# Patient Record
Sex: FEMALE | Race: WHITE | HISPANIC OR LATINO | URBAN - METROPOLITAN AREA
[De-identification: names, ages, dates, MRNs, and addresses within clinical notes are randomized per-mention and may not be internally consistent; named-entity substitution may affect disease eponyms.]

---

## 2022-11-01 ENCOUNTER — WEB ENCOUNTER (OUTPATIENT)
Dept: URBAN - METROPOLITAN AREA CLINIC 60 | Facility: CLINIC | Age: 62
End: 2022-11-01

## 2022-11-04 ENCOUNTER — OFFICE VISIT (OUTPATIENT)
Dept: URBAN - METROPOLITAN AREA CLINIC 60 | Facility: CLINIC | Age: 62
End: 2022-11-04
Payer: COMMERCIAL

## 2022-11-04 VITALS
HEIGHT: 67 IN | TEMPERATURE: 98 F | SYSTOLIC BLOOD PRESSURE: 126 MMHG | WEIGHT: 184 LBS | OXYGEN SATURATION: 97 % | HEART RATE: 72 BPM | RESPIRATION RATE: 20 BRPM | BODY MASS INDEX: 28.88 KG/M2 | DIASTOLIC BLOOD PRESSURE: 74 MMHG

## 2022-11-04 DIAGNOSIS — K29.60 REFLUX GASTRITIS: ICD-10-CM

## 2022-11-04 DIAGNOSIS — K92.1 MELENA: ICD-10-CM

## 2022-11-04 DIAGNOSIS — R74.8 LIVER ENZYME ELEVATION: ICD-10-CM

## 2022-11-04 DIAGNOSIS — Z12.11 COLON CANCER SCREENING: ICD-10-CM

## 2022-11-04 DIAGNOSIS — D64.9 ANEMIA, UNSPECIFIED TYPE: ICD-10-CM

## 2022-11-04 PROCEDURE — 99244 OFF/OP CNSLTJ NEW/EST MOD 40: CPT | Performed by: NURSE PRACTITIONER

## 2022-11-04 PROCEDURE — 99204 OFFICE O/P NEW MOD 45 MIN: CPT | Performed by: NURSE PRACTITIONER

## 2022-11-04 RX ORDER — OMEPRAZOLE 40 MG/1
1 CAPSULE 30 MINUTES BEFORE MORNING MEAL CAPSULE, DELAYED RELEASE ORAL ONCE A DAY
Qty: 90 | Refills: 0 | OUTPATIENT
Start: 2022-11-04

## 2022-11-04 RX ORDER — PANTOPRAZOLE SODIUM 40 MG/1
TAKE 1 TABLET BY MOUTH EVERY MORNING BEFORE BREAKFAST TABLET, DELAYED RELEASE ORAL
Qty: 30 EACH | Refills: 0 | Status: ACTIVE | COMMUNITY

## 2022-11-04 RX ORDER — SUCRALFATE 1 G/1
1 TABLET ON AN EMPTY STOMACH TABLET ORAL TWICE A DAY
Qty: 90 | Refills: 0 | OUTPATIENT
Start: 2022-11-04 | End: 2023-02-01

## 2022-11-04 RX ORDER — MECOBALAMIN 5000 MCG
AS DIRECTED LOZENGE ORAL
Status: ACTIVE | COMMUNITY

## 2022-11-04 RX ORDER — DAPAGLIFLOZIN 5 MG/1
TABLET, FILM COATED ORAL
Qty: 90 TABLET | Refills: 0 | Status: ACTIVE | COMMUNITY

## 2022-11-04 RX ORDER — ORAL SEMAGLUTIDE 14 MG/1
TABLET ORAL
Qty: 30 TABLET | Refills: 0 | Status: ACTIVE | COMMUNITY

## 2022-11-04 RX ORDER — METFORMIN HYDROCHLORIDE 500 MG/1
TAKE 1 TABLET BY MOUTH TWICE DAILY TABLET, FILM COATED ORAL
Qty: 180 EACH | Refills: 0 | Status: ACTIVE | COMMUNITY

## 2022-11-04 NOTE — PHYSICAL EXAM GASTROINTESTINAL
Abdomen: Soft, positive for epigastric tenderness, nondistended , no guarding or rigidity , no masses palpable , normal bowel sounds , Liver and Spleen , no hepatomegaly present , no hepatosplenomegaly , liver nontender , spleen not palpable

## 2022-11-04 NOTE — HPI-TODAY'S VISIT:
Who presented to the ER department around 6 weeks ago complaining of epigastric discomfort and melanotic diarrhea, patient was diagnosed with gastroenteritis and sent home. Patient states she had melanotic stools for several days, she was referred to us for EGD and colonoscopy for anemia, melena, and elevated liver enzymes.  Today she is in good general state.  Denies any rectal bleeding, melena, hematemesis, diarrhea, or any other GI symptoms.  Denies any personal or family history of colorectal cancer. Positive for epigastric tenderness. Laboratories:  CMP normal kidney function, slightly elevated ALT 35.  Hemoglobin A1c elevated 7.6.  Blood sugar at 181.  CBC anemia hemoglobin 11.1 hematocrit 33.9.  Lipids: Triglyceride 355, LDL cholesterol low 38, slightly elevated .  EGD, colonoscopy, and liver work up.

## 2022-11-11 ENCOUNTER — LAB OUTSIDE AN ENCOUNTER (OUTPATIENT)
Dept: URBAN - METROPOLITAN AREA CLINIC 60 | Facility: CLINIC | Age: 62
End: 2022-11-11

## 2022-12-09 PROBLEM — 72950008: Status: ACTIVE | Noted: 2022-12-09

## 2022-12-14 ENCOUNTER — TELEPHONE ENCOUNTER (OUTPATIENT)
Dept: URBAN - METROPOLITAN AREA CLINIC 63 | Facility: CLINIC | Age: 62
End: 2022-12-14

## 2022-12-14 ENCOUNTER — WEB ENCOUNTER (OUTPATIENT)
Dept: URBAN - METROPOLITAN AREA SURGERY CENTER 4 | Facility: SURGERY CENTER | Age: 62
End: 2022-12-14

## 2022-12-16 ENCOUNTER — CLAIMS CREATED FROM THE CLAIM WINDOW (OUTPATIENT)
Dept: URBAN - METROPOLITAN AREA SURGERY CENTER 4 | Facility: SURGERY CENTER | Age: 62
End: 2022-12-16
Payer: COMMERCIAL

## 2022-12-16 DIAGNOSIS — Z12.11 COLON CANCER SCREENING: ICD-10-CM

## 2022-12-16 DIAGNOSIS — K44.9 HIATAL HERNIA: ICD-10-CM

## 2022-12-16 DIAGNOSIS — D62 ACUTE POST-HEMORRHAGIC ANEMIA: ICD-10-CM

## 2022-12-16 DIAGNOSIS — D12.2 ADENOMATOUS POLYP OF ASCENDING COLON: ICD-10-CM

## 2022-12-16 DIAGNOSIS — K29.50 CHRONIC GASTRITIS WITHOUT BLEEDING: ICD-10-CM

## 2022-12-16 DIAGNOSIS — K57.30 DIVERTCULOSIS OF LG INT W/O PERFORATION OR ABSCESS W/O BLEEDING: ICD-10-CM

## 2022-12-16 DIAGNOSIS — K62.1 RECTAL POLYP: ICD-10-CM

## 2022-12-16 DIAGNOSIS — K63.89 OTHER SPECIFIED DISEASES OF INTESTINE: ICD-10-CM

## 2022-12-16 PROCEDURE — 43239 EGD BIOPSY SINGLE/MULTIPLE: CPT | Performed by: INTERNAL MEDICINE

## 2022-12-16 PROCEDURE — 45385 COLONOSCOPY W/LESION REMOVAL: CPT | Performed by: INTERNAL MEDICINE

## 2022-12-16 PROCEDURE — 45380 COLONOSCOPY AND BIOPSY: CPT | Performed by: INTERNAL MEDICINE

## 2022-12-16 RX ORDER — PANTOPRAZOLE SODIUM 40 MG/1
TAKE 1 TABLET BY MOUTH EVERY MORNING BEFORE BREAKFAST TABLET, DELAYED RELEASE ORAL
Qty: 30 EACH | Refills: 0 | Status: ACTIVE | COMMUNITY

## 2022-12-16 RX ORDER — OMEPRAZOLE 40 MG/1
1 CAPSULE 30 MINUTES BEFORE MORNING MEAL CAPSULE, DELAYED RELEASE ORAL ONCE A DAY
Qty: 90 | Refills: 0 | Status: ACTIVE | COMMUNITY
Start: 2022-11-04

## 2022-12-16 RX ORDER — DAPAGLIFLOZIN 5 MG/1
TABLET, FILM COATED ORAL
Qty: 90 TABLET | Refills: 0 | Status: ACTIVE | COMMUNITY

## 2022-12-16 RX ORDER — METFORMIN HYDROCHLORIDE 500 MG/1
TAKE 1 TABLET BY MOUTH TWICE DAILY TABLET, FILM COATED ORAL
Qty: 180 EACH | Refills: 0 | Status: ACTIVE | COMMUNITY

## 2022-12-16 RX ORDER — MECOBALAMIN 5000 MCG
AS DIRECTED LOZENGE ORAL
Status: ACTIVE | COMMUNITY

## 2022-12-16 RX ORDER — SUCRALFATE 1 G/1
1 TABLET ON AN EMPTY STOMACH TABLET ORAL TWICE A DAY
Qty: 90 | Refills: 0 | Status: ACTIVE | COMMUNITY
Start: 2022-11-04 | End: 2023-02-01

## 2022-12-16 RX ORDER — ORAL SEMAGLUTIDE 14 MG/1
TABLET ORAL
Qty: 30 TABLET | Refills: 0 | Status: ACTIVE | COMMUNITY

## 2022-12-20 PROBLEM — 398050005 DIVERTICULAR DISEASE OF COLON: Status: ACTIVE | Noted: 2022-12-20

## 2022-12-20 PROBLEM — 84568007 ATROPHIC GASTRITIS: Status: ACTIVE | Noted: 2022-12-20

## 2022-12-22 ENCOUNTER — OFFICE VISIT (OUTPATIENT)
Dept: URBAN - METROPOLITAN AREA CLINIC 60 | Facility: CLINIC | Age: 62
End: 2022-12-22

## 2022-12-23 ENCOUNTER — DASHBOARD ENCOUNTERS (OUTPATIENT)
Age: 62
End: 2022-12-23

## 2022-12-23 ENCOUNTER — LAB OUTSIDE AN ENCOUNTER (OUTPATIENT)
Dept: URBAN - METROPOLITAN AREA CLINIC 63 | Facility: CLINIC | Age: 62
End: 2022-12-23

## 2022-12-23 ENCOUNTER — OFFICE VISIT (OUTPATIENT)
Dept: URBAN - METROPOLITAN AREA CLINIC 63 | Facility: CLINIC | Age: 62
End: 2022-12-23
Payer: COMMERCIAL

## 2022-12-23 ENCOUNTER — WEB ENCOUNTER (OUTPATIENT)
Dept: URBAN - METROPOLITAN AREA CLINIC 63 | Facility: CLINIC | Age: 62
End: 2022-12-23

## 2022-12-23 VITALS
SYSTOLIC BLOOD PRESSURE: 148 MMHG | WEIGHT: 185 LBS | DIASTOLIC BLOOD PRESSURE: 80 MMHG | BODY MASS INDEX: 29.03 KG/M2 | HEART RATE: 71 BPM | OXYGEN SATURATION: 97 % | TEMPERATURE: 98.1 F | HEIGHT: 67 IN

## 2022-12-23 DIAGNOSIS — D12.6 ADENOMATOUS POLYP OF COLON, UNSPECIFIED PART OF COLON: ICD-10-CM

## 2022-12-23 DIAGNOSIS — R19.7 DIARRHEA, UNSPECIFIED TYPE: ICD-10-CM

## 2022-12-23 DIAGNOSIS — C16.9 ADENOCARCINOMA OF STOMACH: ICD-10-CM

## 2022-12-23 DIAGNOSIS — K64.8 INTERNAL HEMORRHOIDS: ICD-10-CM

## 2022-12-23 PROCEDURE — 99214 OFFICE O/P EST MOD 30 MIN: CPT | Performed by: NURSE PRACTITIONER

## 2022-12-23 RX ORDER — PANTOPRAZOLE SODIUM 40 MG/1
TAKE 1 TABLET BY MOUTH EVERY MORNING BEFORE BREAKFAST TABLET, DELAYED RELEASE ORAL
Qty: 30 EACH | Refills: 0 | Status: ACTIVE | COMMUNITY

## 2022-12-23 RX ORDER — ORAL SEMAGLUTIDE 14 MG/1
TABLET ORAL
Qty: 30 TABLET | Refills: 0 | Status: ACTIVE | COMMUNITY

## 2022-12-23 RX ORDER — SUCRALFATE 1 G/1
1 TABLET ON AN EMPTY STOMACH TABLET ORAL TWICE A DAY
Qty: 90 | Refills: 0 | Status: ACTIVE | COMMUNITY
Start: 2022-11-04 | End: 2023-02-01

## 2022-12-23 RX ORDER — MECOBALAMIN 5000 MCG
AS DIRECTED LOZENGE ORAL
Status: ACTIVE | COMMUNITY

## 2022-12-23 RX ORDER — DAPAGLIFLOZIN 5 MG/1
TABLET, FILM COATED ORAL
Qty: 90 TABLET | Refills: 0 | Status: ACTIVE | COMMUNITY

## 2022-12-23 RX ORDER — METFORMIN HYDROCHLORIDE 500 MG/1
TAKE 1 TABLET BY MOUTH TWICE DAILY TABLET, FILM COATED ORAL
Qty: 180 EACH | Refills: 0 | Status: ACTIVE | COMMUNITY

## 2022-12-23 NOTE — HPI-HPI
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 EGD showed small H hernia, and chronic gastritis. 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 Patient also is complaining of having diarrhea  (please read note above).  Liver work up order from previous  OV  is pending.  Refer her with Oncology Dr Pyle and with oncology surgeon Dr Argueta.   She will complete an CT abdomen and pelvis and continue with treatment  and for gastritis.  Will do pending studies and labs including diarrhea work up. Colonoscopy in 3 year..

## 2022-12-24 PROBLEM — 408647009: Status: ACTIVE | Noted: 2022-12-23

## 2022-12-30 ENCOUNTER — OFFICE VISIT (OUTPATIENT)
Dept: URBAN - METROPOLITAN AREA CLINIC 60 | Facility: CLINIC | Age: 62
End: 2022-12-30

## 2023-01-10 LAB — SED RATE BY MODIFIED: 9

## 2023-01-13 ENCOUNTER — OFFICE VISIT (OUTPATIENT)
Dept: URBAN - METROPOLITAN AREA CLINIC 60 | Facility: CLINIC | Age: 63
End: 2023-01-13

## 2023-01-16 LAB
A/G RATIO: 1.4
ABSOLUTE BASOPHILS: 19
ABSOLUTE EOSINOPHILS: 82
ABSOLUTE LYMPHOCYTES: 1455
ABSOLUTE MONOCYTES: 309
ABSOLUTE NEUTROPHILS: 4435
ACTIN (SMOOTH MUSCLE) ANTIBODY (IGG): <20
AFP, SERUM, TUMOR MARKER: 2
ALBUMIN: 4
ALKALINE PHOSPHATASE: 87
ALPHA-1-ANTITRYPSIN (AAT) PHENOTYPE: (no result)
ALT (SGPT): 23
ANA SCREEN, IFA: NEGATIVE
AST (SGOT): 16
BASOPHILS: 0.3
BILIRUBIN, TOTAL: 0.4
BUN/CREATININE RATIO: 23
BUN: 11
CALCIUM: 9.1
CARBON DIOXIDE, TOTAL: 26
CHLORIDE: 104
CREATININE: 0.47
EGFR: 107
EOSINOPHILS: 1.3
FERRITIN, SERUM: 13
GLOBULIN, TOTAL: 2.9
GLUCOSE: 163
HEMATOCRIT: 42
HEMOGLOBIN: 13.6
HEPATITIS A AB, TOTAL: REACTIVE
HEPATITIS B CORE AB TOTAL: (no result)
HEPATITIS B SURFACE AB IMMUNITY, QN: <5
HEPATITIS B SURFACE ANTIGEN: (no result)
HEPATITIS C ANTIBODY: (no result)
INR: 1
IRON BIND.CAP.(TIBC): 371
IRON SATURATION: 12
IRON: 44
LKM-1 ANTIBODY (IGG): <=20
LYMPHOCYTES: 23.1
MCH: 25.4
MCHC: 32.4
MCV: 78.4
MITOCHONDRIAL (M2) ANTIBODY: <=20
MONOCYTES: 4.9
MPV: 11.2
NEUTROPHILS: 70.4
PLATELET COUNT: 276
POTASSIUM: 4.5
PROTEIN, TOTAL: 6.9
PT: 9.8
RDW: 14.9
RED BLOOD CELL COUNT: 5.36
SIGNAL TO CUT-OFF: 0.08
SODIUM: 138
WHITE BLOOD CELL COUNT: 6.3

## 2023-01-17 LAB
A/G RATIO: 1.4
ABSOLUTE BASOPHILS: 50
ABSOLUTE EOSINOPHILS: 81
ABSOLUTE LYMPHOCYTES: 1476
ABSOLUTE MONOCYTES: 310
ABSOLUTE NEUTROPHILS: 4284
AFP, SERUM: 1.7
AFP-L3%, SERUM: (no result)
ALBUMIN: 4.2
ALKALINE PHOSPHATASE: 91
ALT (SGPT): 25
ANCA SCREEN: NEGATIVE
AST (SGOT): 17
BASOPHILS: 0.8
BILIRUBIN, TOTAL: 0.5
BUN/CREATININE RATIO: (no result)
BUN: 11
C-REACTIVE PROTEIN, QUANT: 4.1
CA 19-9: 7
CALCIUM: 9.1
CARBON DIOXIDE, TOTAL: 27
CEA: <0.5
CHLORIDE: 104
CREATININE: 0.51
EGFR: 105
EOSINOPHILS: 1.3
GLOBULIN, TOTAL: 3
GLUCOSE: 160
HEMATOCRIT: 42.7
HEMOGLOBIN: 13.5
LYMPHOCYTES: 23.8
MCH: 25.3
MCHC: 31.6
MCV: 80
MONOCYTES: 5
MPV: 10.8
MYELOPEROXIDASE ANTIBODY: <1
NEUTROPHILS: 69.1
PLATELET COUNT: 267
POTASSIUM: 4.4
PROTEIN, TOTAL: 7.2
PROTEINASE-3 ANTIBODY: <1
RDW: 15.4
RED BLOOD CELL COUNT: 5.34
SACCHAROMYCES CEREVISIAE AB (ASCA) (IGA): 8.5
SACCHAROMYCES CEREVISIAE AB (ASCA) (IGG): 7
SODIUM: 138
TISSUE TRANSGLUTAMINASE AB, IGA: <1
TISSUE TRANSGLUTAMINASE AB, IGG: <1
WHITE BLOOD CELL COUNT: 6.2

## 2023-01-23 LAB
CALPROTECTIN, FECAL: 37
CAMPYLOBACTER SPP. AG,EIA: (no result)
CLOSTRIDIUM DIFFICILE: (no result)
CRYPTOSPORIDIUM ANTIGEN,: (no result)
FECAL FAT, QUALITATIVE: (no result)
GIARDIA AG, EIA, STOOL: (no result)
LACTOFERRIN, FECAL, QUANT.: <6.25
LEUKOCYTES: (no result)
OVA AND PARASITES, CONC AND PERM SMEAR: (no result)
PANCREATIC ELASTASE, FECAL: >500
ROTAVIRUS AG, EIA: (no result)
SALMONELLA AND SHIGELLA, CULTURE: (no result)
SHIGA TOXINS, EIA W/RFL TO E.COLI O157 CULTURE: (no result)

## 2023-03-15 ENCOUNTER — TELEPHONE ENCOUNTER (OUTPATIENT)
Dept: URBAN - METROPOLITAN AREA CLINIC 63 | Facility: CLINIC | Age: 63
End: 2023-03-15

## 2023-04-20 ENCOUNTER — CLAIMS CREATED FROM THE CLAIM WINDOW (OUTPATIENT)
Dept: URBAN - METROPOLITAN AREA MEDICAL CENTER 14 | Facility: MEDICAL CENTER | Age: 63
End: 2023-04-20
Payer: COMMERCIAL

## 2023-04-20 DIAGNOSIS — K25.9 GASTRIC ULCER: ICD-10-CM

## 2023-04-20 DIAGNOSIS — K92.0 HEMATEMESIS: ICD-10-CM

## 2023-04-20 DIAGNOSIS — C16.9 ADENOCARCINOMA OF STOMACH: ICD-10-CM

## 2023-04-20 DIAGNOSIS — K92.2 ACUTE GI BLEEDING: ICD-10-CM

## 2023-04-20 DIAGNOSIS — R11.0 NAUSEA: ICD-10-CM

## 2023-04-20 PROCEDURE — 43235 EGD DIAGNOSTIC BRUSH WASH: CPT | Performed by: INTERNAL MEDICINE

## 2023-04-20 PROCEDURE — 43255 EGD CONTROL BLEEDING ANY: CPT | Performed by: INTERNAL MEDICINE

## 2023-04-20 PROCEDURE — 43236 UPPR GI SCOPE W/SUBMUC INJ: CPT | Performed by: INTERNAL MEDICINE

## 2023-04-20 PROCEDURE — 99223 1ST HOSP IP/OBS HIGH 75: CPT | Performed by: INTERNAL MEDICINE

## 2023-04-22 ENCOUNTER — CLAIMS CREATED FROM THE CLAIM WINDOW (OUTPATIENT)
Dept: URBAN - METROPOLITAN AREA MEDICAL CENTER 14 | Facility: MEDICAL CENTER | Age: 63
End: 2023-04-22
Payer: COMMERCIAL

## 2023-04-22 DIAGNOSIS — K92.2 ACUTE GI BLEEDING: ICD-10-CM

## 2023-04-22 PROCEDURE — 993 AGA: Performed by: INTERNAL MEDICINE
